# Patient Record
Sex: FEMALE | Race: WHITE | NOT HISPANIC OR LATINO | URBAN - NONMETROPOLITAN AREA
[De-identification: names, ages, dates, MRNs, and addresses within clinical notes are randomized per-mention and may not be internally consistent; named-entity substitution may affect disease eponyms.]

---

## 2017-03-01 ENCOUNTER — OFFICE VISIT (OUTPATIENT)
Dept: OTOLARYNGOLOGY | Facility: CLINIC | Age: 62
End: 2017-03-01

## 2017-03-01 VITALS — WEIGHT: 118 LBS | HEIGHT: 66 IN | BODY MASS INDEX: 18.96 KG/M2 | TEMPERATURE: 97.4 F

## 2017-03-01 DIAGNOSIS — H81.09 MENIERE DISEASE, UNSPECIFIED LATERALITY: ICD-10-CM

## 2017-03-01 DIAGNOSIS — J31.0 CHRONIC RHINITIS: Primary | ICD-10-CM

## 2017-03-01 PROCEDURE — 99213 OFFICE O/P EST LOW 20 MIN: CPT | Performed by: OTOLARYNGOLOGY

## 2017-03-01 RX ORDER — RALOXIFENE HYDROCHLORIDE 60 MG/1
60 TABLET, FILM COATED ORAL DAILY
COMMUNITY
Start: 2016-11-18 | End: 2017-11-19

## 2017-03-01 RX ORDER — GUAIFENESIN 600 MG/1
1200 TABLET, EXTENDED RELEASE ORAL AS NEEDED
COMMUNITY

## 2017-03-01 RX ORDER — ESTRADIOL 10 UG/1
10 INSERT VAGINAL 2 TIMES WEEKLY
COMMUNITY
End: 2017-08-24

## 2017-03-01 RX ORDER — ALBUTEROL SULFATE 2.5 MG/3ML
2.5 SOLUTION RESPIRATORY (INHALATION) AS NEEDED
COMMUNITY

## 2017-03-02 NOTE — PROGRESS NOTES
Subjective   Elsi Mccollum is a 61 y.o. female.       History of Present Illness   Patient is followed with Ménière's disease which has responded well to therapy utilizing a half tablet of triamterene/HCTZ daily.  Returns ahead of schedule today because she says she's had multiple sinus infections through the winter.  She is being treated by an allergist and uses Flonase and Singulair.  During flareup she describes nasal congestion facial pain postnasal drainage and productive cough.  Is usually treated with antibiotics and steroids.      The following portions of the patient's history were reviewed and updated as appropriate: allergies, current medications, past family history, past medical history, past social history, past surgical history and problem list.     reports that she has never smoked. She does not have any smokeless tobacco history on file. She reports that she drinks alcohol.   Patient is not a tobacco user and has not been counseled for use of tobacco products      Review of Systems   Constitutional: Negative for fever.           Objective   Physical Exam  General: Well-developed well-nourished female in no acute distress.  Alert and oriented ×3. Head: Normocephalic. Face: Symmetrical strength and appearance. PERRL. EOMI. Voice:Strong. Speech:Fluent  Ears: External ears no deformity, canals no discharge, tympanic membranes intact clear and mobile bilaterally.  Nose: Nares show no discharge mass polyp or purulence.  Boggy mucosa is present.  No gross external deformity.  Septum: To the right  Oral cavity: Lips and gums without lesions.  Tongue and floor of mouth without lesions.  Parotid and submandibular ducts unobstructed.  No mucosal lesions on the buccal mucosa or vestibule of the mouth.  Pharynx: No erythema exudate mass or ulcer  Neck: No lymphadenopathy.  No thyromegaly.  Trachea and larynx midline.  No masses in the parotid or submandibular glands.      Assessment/Plan   Elsi was seen today  for follow-up.    Diagnoses and all orders for this visit:    Chronic rhinitis    Meniere disease, unspecified laterality        Plan: Told the patient that I believe she likely has just had recurring viral upper respiratory infections or the course of the winter.  I have urged her to continue using her Flonase along with Singulair and using nasal saline spray frequently.  I'll be interested to see what her clinical course is as the weather gets warmer.  If her symptoms improved significantly and I think this confirms my believe that these are just recurring viral illnesses and not actual chronic bacterial rhinosinusitis.  If she improves she should just keep her previously scheduled follow-up for August 2017 but call if her recurrent nasal symptoms persist into the warmer months.

## 2017-08-24 ENCOUNTER — CLINICAL SUPPORT (OUTPATIENT)
Dept: AUDIOLOGY | Facility: CLINIC | Age: 62
End: 2017-08-24

## 2017-08-24 ENCOUNTER — OFFICE VISIT (OUTPATIENT)
Dept: OTOLARYNGOLOGY | Facility: CLINIC | Age: 62
End: 2017-08-24

## 2017-08-24 VITALS — HEIGHT: 66 IN | TEMPERATURE: 97.6 F | WEIGHT: 123 LBS | BODY MASS INDEX: 19.77 KG/M2

## 2017-08-24 DIAGNOSIS — H81.09 MENIERE DISEASE, UNSPECIFIED LATERALITY: Primary | ICD-10-CM

## 2017-08-24 DIAGNOSIS — Z01.10 ENCOUNTER FOR EXAMINATION OF HEARING WITHOUT ABNORMAL FINDINGS: Primary | ICD-10-CM

## 2017-08-24 PROCEDURE — 99213 OFFICE O/P EST LOW 20 MIN: CPT | Performed by: OTOLARYNGOLOGY

## 2017-08-24 PROCEDURE — 92557 COMPREHENSIVE HEARING TEST: CPT | Performed by: AUDIOLOGIST

## 2017-08-24 PROCEDURE — 92567 TYMPANOMETRY: CPT | Performed by: AUDIOLOGIST

## 2017-08-24 RX ORDER — TRIAMTERENE AND HYDROCHLOROTHIAZIDE 37.5; 25 MG/1; MG/1
CAPSULE ORAL
Qty: 45 CAPSULE | Refills: 3 | Status: SHIPPED | OUTPATIENT
Start: 2017-08-24 | End: 2018-09-27 | Stop reason: SDUPTHER

## 2017-08-24 NOTE — PROGRESS NOTES
STANDARD AUDIOMETRIC EVALUATION      Name:  Elsi Mccollum  :  1955  Age:  61 y.o.  Date of Evaluation:  2017      HISTORY    Reason for visit:  Elsi Mccollum is seen today for a hearing evaluation at the request of Dr. Russ Breaux.  Patient reports no changes in her hearing and no major Meniere's attacks.      EVALUATION    See Audiogram    RESULTS        Otoscopy and Tympanometry 226 Hz :  Right Ear:  Otoscopy:  Clear ear canal          Tympanometry:  Middle ear function within normal limits    Left Ear:   Otoscopy:  Clear ear canal        Tympanometry:  Middle ear function within normal limits    Test technique:  Standard Audiometry     Pure Tone Audiometry:   Patient responded to pure tones at 0-15 dB for 250-8000 Hz in right ear, and at 0-10 dB for 250-8000 Hz in left ear.       Speech Audiometry:        Right Ear:  Speech Reception Threshold (SRT) was obtained at 0 dBHL                 Speech Discrimination scores were 100% in quiet when words were presented at 40 dBHL       Left Ear:  Speech Reception Threshold (SRT) was obtained at 5 dBHL                 Speech Discrimination scores were 100% in quiet when words were presented at 45 dBHL    Reliability:   good    IMPRESSIONS:  1.  Tympanometry results are consistent with Middle ear function within normal limits in both ears.  2.  Pure tone results are consistent with hearing sensitivity within normal limits for both ears.       RECOMMENDATIONS:  Patient is seeing the Ear Nose and Throat physician immediately following this examination.  It was a pleasure seeing Elsi Mccollum in Audiology today.  We would be happy to do further testing or discuss these test as necessary.          This document has been electronically signed by KRYSTYNA White on 2017 10:57 AM          KRYSTYNA White  Licensed Audiologist

## 2017-08-27 NOTE — PROGRESS NOTES
Subjective   Elsi Mccollum is a 61 y.o. female.       History of Present Illness   Patient has a history of Ménière's disease that responded well to medical therapy.  Hearing returned completely normal bilaterally.  Uses a half tablet of triamterene HCTZ 37.5/25 daily.  Says she's had no significant change in her hearing and no dizziness.  Was also seen during the winter with chronic rhinitis thought to be recurrent viral illnesses says her nose is doing much better now that the weather has improved.  No otorrhea.      The following portions of the patient's history were reviewed and updated as appropriate: allergies, current medications, past family history, past medical history, past social history, past surgical history and problem list.     reports that she has never smoked. She has never used smokeless tobacco. She reports that she drinks alcohol.   Patient is not a tobacco user and has not been counseled for use of tobacco products      Review of Systems   Constitutional: Negative for fever.           Objective   Physical Exam  General: Well-developed well-nourished female in no acute distress.  Alert and oriented ×3. Head: Normocephalic. Face: Symmetrical strength and appearance. PERRL. EOMI. Voice:Strong. Speech:Fluent  Ears: External ears no deformity, canals no discharge, tympanic membranes intact clear and mobile bilaterally.  Nose: Nares show no discharge mass polyp or purulence.  Boggy mucosa is present.  No gross external deformity.  Septum: Midline  Oral cavity: Lips and gums without lesions.  Tongue and floor of mouth without lesions.  Parotid and submandibular ducts unobstructed.  No mucosal lesions on the buccal mucosa or vestibule of the mouth.  Pharynx: No erythema exudate mass or ulcer  Neck: No lymphadenopathy.  No thyromegaly.  Trachea and larynx midline.  No masses in the parotid or submandibular glands.    Audiogram is obtained and reviewed and shows normal hearing bilaterally with type A  tympanograms.  Discrimination scores are 100% bilaterally.      Assessment/Plan   Elsi was seen today for follow-up.    Diagnoses and all orders for this visit:    Meniere disease, unspecified laterality        Plan: Given her excellent clinical results as well as the fact that she is tolerating her treatment regimen I don't think anything should be changed at this point a new prescription will be sent in for her diuretic and she'll be seen in a year with another hearing test.  She'll be seeing Dr. Hall next month for her annual physical and will have her blood work done there.

## 2018-01-10 ENCOUNTER — TRANSCRIBE ORDERS (OUTPATIENT)
Dept: ORTHOPEDIC SURGERY | Facility: CLINIC | Age: 63
End: 2018-01-10

## 2018-01-10 DIAGNOSIS — M79.645 FINGER PAIN, LEFT: Primary | ICD-10-CM

## 2018-01-24 ENCOUNTER — OFFICE VISIT (OUTPATIENT)
Dept: ORTHOPEDIC SURGERY | Facility: CLINIC | Age: 63
End: 2018-01-24

## 2018-01-24 VITALS — HEIGHT: 66 IN | BODY MASS INDEX: 20.89 KG/M2 | WEIGHT: 130 LBS

## 2018-01-24 DIAGNOSIS — M79.645 FINGER PAIN, LEFT: Primary | ICD-10-CM

## 2018-01-24 DIAGNOSIS — S63.259A DISLOCATION CLOSED, FINGER, INITIAL ENCOUNTER: ICD-10-CM

## 2018-01-24 PROCEDURE — 99202 OFFICE O/P NEW SF 15 MIN: CPT | Performed by: ORTHOPAEDIC SURGERY

## 2018-01-24 NOTE — PROGRESS NOTES
Elsi Mccollum is a 62 y.o. female   Primary provider:  Ronaldo Hall MD       Chief Complaint   Patient presents with   • Left Hand - Pain     Index finger.        HISTORY OF PRESENT ILLNESS: patient was squatting at dog and finger bent.     Hand Pain    The incident occurred more than 1 week ago (10/8/2017). The incident occurred at home. The pain is present in the left fingers. The quality of the pain is described as aching and stabbing (redness, swelling. ). The pain does not radiate. The pain is at a severity of 2/10. The pain is mild. The pain has been intermittent since the incident. Exacerbated by: using hand.  She has tried nothing for the symptoms.   A she stated she fell and dislocated her finger when she was camping in October of this year she was seen in the [facility] she reduced it herself swelled up she is placed in a splint for a while nobody, told her what the problem was what to do and that she's just started moving it better prominence pain and limited range of motion had an x-ray back in 2017 which shows questionable lesion in the middle phalanx the new x-ray in 2018 questionable but we have no other films sent to us to compare to and there is a little evulsion fracture of the volar plate was consistent with a dislocation joint is reduced     CONCURRENT MEDICAL HISTORY:    Past Medical History:   Diagnosis Date   • Acute bronchitis    • Common cold    • Conjunctival hemorrhage of right eye    • Cough    • Diarrhea    • Fever    • Nausea and vomiting    • Neck pain    • Upper respiratory infection        Allergies   Allergen Reactions   • Penicillins          Current Outpatient Prescriptions:   •  albuterol (PROVENTIL) (2.5 MG/3ML) 0.083% nebulizer solution, Inhale 2.5 mg As Needed., Disp: , Rfl:   •  fluticasone (FLONASE) 50 MCG/ACT nasal spray, 2 SPRAYS BY NASAL ROUTE DAILY., Disp: , Rfl: 3  •  Fluticasone Furoate-Vilanterol (BREO ELLIPTA) 100-25 MCG/INH aerosol powder , Inhale 1 puff  "Daily., Disp: , Rfl:   •  guaiFENesin (MUCINEX) 600 MG 12 hr tablet, Take 1,200 mg by mouth As Needed., Disp: , Rfl:   •  montelukast (SINGULAIR) 10 MG tablet, Take 10 mg by mouth every night., Disp: , Rfl:   •  pantoprazole (PROTONIX) 40 MG EC tablet, Take 40 mg by mouth daily., Disp: , Rfl:   •  triamterene-hydrochlorothiazide (DYAZIDE) 37.5-25 MG per capsule, 1/2 tab po qd, Disp: 45 capsule, Rfl: 3  •  triamterene-hydrochlorothiazide (MAXZIDE-25) 37.5-25 MG per tablet, Take 1 tablet by mouth every other day., Disp: , Rfl:   •  VESICARE 5 MG tablet, TAKE ONE TABLET BY MOUTH DAILY., Disp: , Rfl: 4    Past Surgical History:   Procedure Laterality Date   • INJECTION OF MEDICATION  01/15/2013    Pancho Wilson)  Carrol Gaona       No family history on file.     Social History     Social History   • Marital status:      Spouse name: N/A   • Number of children: N/A   • Years of education: N/A     Occupational History   • Not on file.     Social History Main Topics   • Smoking status: Never Smoker   • Smokeless tobacco: Never Used   • Alcohol use Yes      Comment: social   • Drug use: Not on file   • Sexual activity: Not on file     Other Topics Concern   • Not on file     Social History Narrative        Review of Systems   Constitutional:        Hay fever   All other systems reviewed and are negative.      PHYSICAL EXAMINATION:       Ht 167.6 cm (66\")  Wt 59 kg (130 lb)  BMI 20.98 kg/m2    Physical Exam    GAIT:     [x]  Normal  []  Antalgic    Assistive device: [x]  None  []  Walker     []  Crutches  []  Cane     []  Wheelchair  []  Stretcher    Ortho Exam  Patient lacks about 5° full extension she has about 90° of flexion joint stable this is enlarged in comparison to the other fingers or digits on the opposite hand or same hand she has pain in the joint and another neurovascular status is normal all tendons are functional  X-ray finger index left 2 or 3 views1/4/2018  [facility]  Result Impression "       Questionable fracture palmar aspect base middle phalanx index finger.  Please correlate for specific tenderness in this area.   Result Narrative   Indication:  Injured left index finger.    Left Index Finger, Three Views:  The index finger is normally aligned.  Along the palmar aspect of the base of the middle phalanx, there is a 1.0 x 2.0 mm lucent lesion that was seen on the November 2, 2017 film.  A subtle pathologic fracture along the   articular aspect of the cyst is possible.    No other bony abnormality.   Status Results Details     Encounter Summary   2017 November  X-ray hand left 3+ views11/2/2017  [facility]  Result Impression     No fracture or dislocation evident.   Result Narrative   Left hand, three views    Indication:  Left finger pain, dislocation injury.    Findings: No fracture or dislocation is evident in the left hand. Mineralization and alignment are normal. No erosions or abnormal soft tissue calcifications are seen.  Carpal bone alignment is normal. Mild soft tissue swelling is seen around the second   digit with no fracture identified. Mild/moderate degenerative changes are seen at the first CMC joint and bulky osteophytes also seen around the distal ulna which may correlate with an old healed fracture.             ASSESSMENT:    Diagnoses and all orders for this visit:    Finger pain, left          PLANGet her old x-rays show he can review all 3 x-rays and I instructed her in exercises activity for her finger she has limited range of motion secondary post op dislocation she started protecting her finger when in actuality she should be working it much harder exercises were given to her and we'll follow her up after she brings her x-rays    No Follow-up on file.    Kwabena Garzon MD

## 2018-01-29 ENCOUNTER — OFFICE VISIT (OUTPATIENT)
Dept: ORTHOPEDIC SURGERY | Facility: CLINIC | Age: 63
End: 2018-01-29

## 2018-01-29 VITALS — HEIGHT: 66 IN | WEIGHT: 130 LBS | BODY MASS INDEX: 20.89 KG/M2

## 2018-01-29 DIAGNOSIS — S63.259D DISLOCATION, FINGER, SUBSEQUENT ENCOUNTER: ICD-10-CM

## 2018-01-29 DIAGNOSIS — M79.645 FINGER PAIN, LEFT: Primary | ICD-10-CM

## 2018-01-29 PROCEDURE — 99213 OFFICE O/P EST LOW 20 MIN: CPT | Performed by: ORTHOPAEDIC SURGERY

## 2018-01-29 NOTE — PROGRESS NOTES
Elsi Mccollum is a 62 y.o. female returns for     Chief Complaint   Patient presents with   • Left Hand - Follow-up     Index finger        HISTORY OF PRESENT ILLNESS: patient in today with copies of xrays to go over.   Patient returned with all her x-rays reviewed all her films from initial films were actually negative and now she's develop a small fragment of the volar aspect probably in the volar plate and there is some questionable cyst there is no tumor or intraosseous lesion     CONCURRENT MEDICAL HISTORY:    Past Medical History:   Diagnosis Date   • Acute bronchitis    • Common cold    • Conjunctival hemorrhage of right eye    • Cough    • Diarrhea    • Fever    • Nausea and vomiting    • Neck pain    • Upper respiratory infection        Allergies   Allergen Reactions   • Penicillins          Current Outpatient Prescriptions:   •  albuterol (PROVENTIL) (2.5 MG/3ML) 0.083% nebulizer solution, Inhale 2.5 mg As Needed., Disp: , Rfl:   •  fluticasone (FLONASE) 50 MCG/ACT nasal spray, 2 SPRAYS BY NASAL ROUTE DAILY., Disp: , Rfl: 3  •  Fluticasone Furoate-Vilanterol (BREO ELLIPTA) 100-25 MCG/INH aerosol powder , Inhale 1 puff Daily., Disp: , Rfl:   •  guaiFENesin (MUCINEX) 600 MG 12 hr tablet, Take 1,200 mg by mouth As Needed., Disp: , Rfl:   •  montelukast (SINGULAIR) 10 MG tablet, Take 10 mg by mouth every night., Disp: , Rfl:   •  pantoprazole (PROTONIX) 40 MG EC tablet, Take 40 mg by mouth daily., Disp: , Rfl:   •  triamterene-hydrochlorothiazide (DYAZIDE) 37.5-25 MG per capsule, 1/2 tab po qd, Disp: 45 capsule, Rfl: 3  •  triamterene-hydrochlorothiazide (MAXZIDE-25) 37.5-25 MG per tablet, Take 1 tablet by mouth every other day., Disp: , Rfl:   •  VESICARE 5 MG tablet, TAKE ONE TABLET BY MOUTH DAILY., Disp: , Rfl: 4    Past Surgical History:   Procedure Laterality Date   • INJECTION OF MEDICATION  01/15/2013    Eusebioalog (2)  -  YARELIS Gaona       ROS  No fevers or chills.  No chest pain or shortness of air.  No GI  "or  disturbances.    PHYSICAL EXAMINATION:       Ht 167.6 cm (66\")  Wt 59 kg (130 lb)  BMI 20.98 kg/m2    Physical Exam    GAIT:     [x]  Normal  []  Antalgic    Assistive device: [x]  None  []  Walker     []  Crutches  []  Cane     []  Wheelchair  []  Stretcher    Ortho Exam compare to the previous examination week ago patient's range of motion is improved by her own therapy I discussed therapy with her and her  and we decided that she will do her own therapy on her own discontinue use her finger in time this should improve and increase her range of motion I gave her all her x-rays back to her on disks      No results found.          ASSESSMENT:    Diagnoses and all orders for this visit:    Finger pain, left    Dislocation, finger, subsequent encounter          PLAN    No Follow-up on file.    Meaghan Mccurdy MA  "

## 2018-09-27 ENCOUNTER — OFFICE VISIT (OUTPATIENT)
Dept: OTOLARYNGOLOGY | Facility: CLINIC | Age: 63
End: 2018-09-27

## 2018-09-27 ENCOUNTER — CLINICAL SUPPORT (OUTPATIENT)
Dept: AUDIOLOGY | Facility: CLINIC | Age: 63
End: 2018-09-27

## 2018-09-27 VITALS — WEIGHT: 116.4 LBS | OXYGEN SATURATION: 98 % | HEIGHT: 65 IN | BODY MASS INDEX: 19.39 KG/M2

## 2018-09-27 DIAGNOSIS — Z01.10 ENCOUNTER FOR EXAMINATION OF HEARING WITHOUT ABNORMAL FINDINGS: Primary | ICD-10-CM

## 2018-09-27 DIAGNOSIS — H81.09 MENIERE'S DISEASE, UNSPECIFIED LATERALITY: Primary | ICD-10-CM

## 2018-09-27 PROCEDURE — 99213 OFFICE O/P EST LOW 20 MIN: CPT | Performed by: OTOLARYNGOLOGY

## 2018-09-27 PROCEDURE — 92567 TYMPANOMETRY: CPT | Performed by: AUDIOLOGIST

## 2018-09-27 PROCEDURE — 92557 COMPREHENSIVE HEARING TEST: CPT | Performed by: AUDIOLOGIST

## 2018-09-27 RX ORDER — TRIAMTERENE AND HYDROCHLOROTHIAZIDE 37.5; 25 MG/1; MG/1
CAPSULE ORAL
Qty: 45 CAPSULE | Refills: 3 | Status: SHIPPED | OUTPATIENT
Start: 2018-09-27 | End: 2019-10-02

## 2018-09-27 RX ORDER — TRAZODONE HYDROCHLORIDE 50 MG/1
50 TABLET ORAL NIGHTLY
COMMUNITY
End: 2023-01-10

## 2018-09-27 NOTE — PROGRESS NOTES
STANDARD AUDIOMETRIC EVALUATION      Name:  Elsi Mccollum  :  1955  Age:  62 y.o.  Date of Evaluation:  2018      HISTORY    Reason for visit:  Elsi Mccollum is seen today for a hearing evaluation at the request of Dr. Russ Breaux.  Patient reports her ears are okay today, and she has not had any changes or problems.      EVALUATION    See Audiogram    RESULTS        Otoscopy and Tympanometry 226 Hz :  Right Ear:  Otoscopy:  Clear ear canal          Tympanometry:  Middle ear function within normal limits    Left Ear:   Otoscopy:  Clear ear canal        Tympanometry:  Middle ear function within normal limits    Test technique:  Standard Audiometry     Pure Tone Audiometry:   Patient responded to pure tones at 5-15 dB for 250-8000 Hz in both ears.      Speech Audiometry:        Right Ear:  Speech Reception Threshold (SRT) was obtained at 5 dBHL                 Speech Discrimination scores were 100% in quiet when words were presented at 45 dBHL       Left Ear:  Speech Reception Threshold (SRT) was obtained at 5 dBHL                 Speech Discrimination scores were 100% in quiet when words were presented at 45 dBHL    Reliability:   good    IMPRESSIONS:  1.  Tympanometry results are consistent with Middle ear function within normal limits in both ears.  2.  Pure tone results are consistent with hearing sensitivity within normal limits for both ears.       RECOMMENDATIONS:  Patient is seeing the Ear Nose and Throat physician immediately following this examination.  It was a pleasure seeing Elsi Mccollum in Audiology today.  We would be happy to do further testing or discuss these test as necessary.          This document has been electronically signed by Leticia Barillas MS CCC-ANTWON on 2018 10:00 AM       Leticia Barillas MS CCC-ANTWON  Licensed Audiologist

## 2018-10-01 RX ORDER — TRIAMTERENE AND HYDROCHLOROTHIAZIDE 37.5; 25 MG/1; MG/1
TABLET ORAL
Qty: 45 TABLET | Refills: 3 | OUTPATIENT
Start: 2018-10-01

## 2018-10-01 NOTE — PROGRESS NOTES
Subjective   Elsi Mccollum is a 62 y.o. female.       History of Present Illness   Patient has been followed with a history of Ménière's disease that responded well to medical therapy.  Her hearing had returned to normal in both ears.  Typically uses a half tablet of triamterene/HCTZ 37.5/25 daily.  Reports that her  passed away this summer and since then she has not been taking her medication regularly.  Says she is not noticed any change in her hearing or any significant dizziness.  No otorrhea.      The following portions of the patient's history were reviewed and updated as appropriate: allergies, current medications, past family history, past medical history, past social history, past surgical history and problem list.     reports that she has never smoked. She has never used smokeless tobacco. She reports that she drinks alcohol.   Patient is not a tobacco user and has not been counseled for use of tobacco products      Review of Systems   Constitutional: Negative for fever.           Objective   Physical Exam  General: Well-developed well-nourished female in no acute distress.  Alert and oriented ×3. Head: Normocephalic. Voice:Strong. Speech:Fluent  Ears: External ears no deformity, canals show some nonobstructing wax is cleaned as a courtesy, tympanic membranes intact clear and mobile bilaterally.  Nose: Nares show no discharge mass polyp or purulence.  Boggy mucosa is present.  No gross external deformity.  Septum: Midline  Oral cavity: Lips and gums without lesions.  Tongue and floor of mouth without lesions.  Parotid and submandibular ducts unobstructed.  No mucosal lesions on the buccal mucosa or vestibule of the mouth.  Pharynx: No erythema exudate mass or ulcer  Neck: No lymphadenopathy.  No thyromegaly.  Trachea and larynx midline.  No masses in the parotid or submandibular glands.    Audiogram is obtained and reviewed and shows normal hearing bilaterally with type A tympanograms.   Discrimination scores are 100% bilaterally.      Assessment/Plan   Elsi was seen today for follow-up.    Diagnoses and all orders for this visit:    Meniere's disease, unspecified laterality    Other orders  -     triamterene-hydrochlorothiazide (DYAZIDE) 37.5-25 MG per capsule; 1/2 tab po qd        Plan: Since her disease as well controlled, I will go ahead and refill her triamterene/HCTZ at her current dosage of half tablet daily, but told her since she has not been taking it regularly if she wants to try to manage this with low salt diet only and no medication I think that would be reasonable.  I told her if she did discontinue the medication to just return in a year for reevaluation but call right away and/or resume medical therapy if she experiences recurrence of her symptoms

## 2019-10-02 ENCOUNTER — OFFICE VISIT (OUTPATIENT)
Dept: OTOLARYNGOLOGY | Facility: CLINIC | Age: 64
End: 2019-10-02

## 2019-10-02 VITALS — OXYGEN SATURATION: 98 % | WEIGHT: 127.2 LBS | BODY MASS INDEX: 21.19 KG/M2 | HEIGHT: 65 IN | HEART RATE: 78 BPM

## 2019-10-02 DIAGNOSIS — H61.23 BILATERAL IMPACTED CERUMEN: Primary | ICD-10-CM

## 2019-10-02 DIAGNOSIS — H81.09 MENIERE'S DISEASE, UNSPECIFIED LATERALITY: ICD-10-CM

## 2019-10-02 PROCEDURE — 69210 REMOVE IMPACTED EAR WAX UNI: CPT | Performed by: OTOLARYNGOLOGY

## 2019-10-02 PROCEDURE — 99213 OFFICE O/P EST LOW 20 MIN: CPT | Performed by: OTOLARYNGOLOGY

## 2019-10-02 RX ORDER — LORATADINE 10 MG/1
10 TABLET ORAL DAILY
COMMUNITY
End: 2023-01-10

## 2019-10-02 RX ORDER — TRIAMTERENE AND HYDROCHLOROTHIAZIDE 37.5; 25 MG/1; MG/1
TABLET ORAL
Qty: 30 TABLET | Refills: 6 | Status: SHIPPED | OUTPATIENT
Start: 2019-10-02

## 2019-10-02 RX ORDER — ESTRADIOL 10 UG/1
10 INSERT VAGINAL DAILY
COMMUNITY
Start: 2018-08-16 | End: 2023-01-10

## 2019-10-02 RX ORDER — CLOBETASOL PROPIONATE 0.05 G/100ML
SHAMPOO TOPICAL
Refills: 5 | COMMUNITY
Start: 2019-08-12 | End: 2023-01-10

## 2019-10-04 NOTE — PROGRESS NOTES
Subjective   Elsi Mccollum is a 63 y.o. female.       History of Present Illness   Patient has Ménière's disease.  Hearing had responded to treatment with diuretic therapy.  Hearing had returned to normal in both ears.  When she was seen a year ago she had been only taking her diuretic intermittently and was not having any significant symptoms so I told her if she wanted to try herself off of the medication she could.  She reports she tried on a couple of occasions but reports that in each instance the roaring in her ears and fullness would get worse and so she now takes 1/2 tablet of triamterene/HCTZ 37.5/25 every day.  She feels like her hearing is back to normal.  Dr. Hall checks lab work on her.  No significant dizziness.      The following portions of the patient's history were reviewed and updated as appropriate: allergies, current medications, past family history, past medical history, past social history, past surgical history and problem list.     reports that she has never smoked. She has never used smokeless tobacco. She reports that she drinks alcohol.   Patient is not a tobacco user and has not been counseled for use of tobacco products      Review of Systems   Constitutional: Negative for fever.           Objective   Physical Exam  Ears: External ears no deformity.  Both ear canals show cerumen impactions  Using the binocular microscope for visualization, cerumen impaction was removed from bilateral ear canal(s) using instrumentation. This was personally performed by Russ Breaux MD  Following cerumen removal tympanic membranes were noted to be intact and clear  Nose: Nares show no discharge mass polyp or purulence.  Boggy mucosa is present.  No gross external deformity.  Oral cavity: Lips and gums without lesions.  Tongue and floor of mouth without lesions.  Parotid and submandibular ducts unobstructed.  No mucosal lesions on the buccal mucosa or vestibule of the mouth.  Pharynx: No  erythema, exudate, mass  Neck: No lymphadenopathy.  No thyromegaly.  Trachea and larynx midline.  No masses in the parotid or submandibular glands.  Audiogram is not obtained today because once the patient's ears were cleaned she felt like her hearing was normal and at baseline in both ears.    Assessment/Plan   Elsi was seen today for follow-up.    Diagnoses and all orders for this visit:    Bilateral impacted cerumen    Meniere's disease, unspecified laterality    Other orders  -     triamterene-hydrochlorothiazide (MAXZIDE-25) 37.5-25 MG per tablet; 1/2 tablet by mouth daily      Plan: Cerumen removed as described above.  Since she notices significantly worsened symptoms when she tries to go without her diuretic I think she should continue triamterene/HCTZ 37.5/25 1 p.o. daily.  Follow-up with Dr. Hall regarding her labs.  Return to see me in 1 year, call sooner for problems.

## 2019-10-16 ENCOUNTER — OFFICE VISIT (OUTPATIENT)
Dept: OTOLARYNGOLOGY | Facility: CLINIC | Age: 64
End: 2019-10-16

## 2019-10-16 VITALS — HEIGHT: 65 IN | WEIGHT: 127 LBS | RESPIRATION RATE: 18 BRPM | BODY MASS INDEX: 21.16 KG/M2

## 2019-10-16 DIAGNOSIS — H92.01 RIGHT EAR PAIN: Primary | ICD-10-CM

## 2019-10-16 PROCEDURE — 99213 OFFICE O/P EST LOW 20 MIN: CPT | Performed by: OTOLARYNGOLOGY

## 2019-10-16 RX ORDER — CICLOPIROX 1 G/100ML
SHAMPOO TOPICAL
Refills: 5 | COMMUNITY
Start: 2019-10-01 | End: 2023-01-10

## 2019-10-20 NOTE — PROGRESS NOTES
Subjective   Elsi Mccollum is a 63 y.o. female.       History of Present Illness   Patient with a history of Ménière's disease that has responded to diuretic therapy.  Was seen on 10-19.  Reports that shortly thereafter she began having some right-sided ear pain.  She says it feels like when she chews she has something in it is not been draining.  Pain is worse when she opens her jaw widely.      The following portions of the patient's history were reviewed and updated as appropriate: allergies, current medications, past family history, past medical history, past social history, past surgical history and problem list.     reports that she has never smoked. She has never used smokeless tobacco. She reports that she drinks alcohol.   Patient is not a tobacco user and has not been counseled for use of tobacco products      Review of Systems   Constitutional: Negative for fever.           Objective   Physical Exam  Ears: External ears no deformity, canals no discharge, tympanic membranes intact clear and mobile bilaterally.  Oral cavity: Lips and gums without lesions.  Tongue and floor of mouth without lesions.  Parotid and submandibular ducts unobstructed.  No mucosal lesions on the buccal mucosa or vestibule of the mouth.  Pharynx: No erythema or exudate  Neck: No lymphadenopathy.  No thyromegaly.  Trachea and larynx midline.  No masses in the parotid or submandibular glands.  TMJ is tender to palpation on the right.    Assessment/Plan   lEsi was seen today for ear problem.    Diagnoses and all orders for this visit:    Right ear pain      Plan: Explained to her that the feeling of fullness and pain is due to inflammation of the temporomandibular joint.  I reassured her she did not have anything in her ear that needed cleaning.  Advised soft diet and use of over-the-counter Aleve or ibuprofen.  If symptoms improve she may keep her previously scheduled follow-up.  If symptoms persist suggest a dental  evaluation.

## 2021-02-25 ENCOUNTER — IMMUNIZATION (OUTPATIENT)
Dept: VACCINE CLINIC | Facility: HOSPITAL | Age: 66
End: 2021-02-25

## 2021-02-25 PROCEDURE — 91300 HC SARSCOV02 VAC 30MCG/0.3ML IM: CPT | Performed by: THORACIC SURGERY (CARDIOTHORACIC VASCULAR SURGERY)

## 2021-02-25 PROCEDURE — 0001A: CPT | Performed by: THORACIC SURGERY (CARDIOTHORACIC VASCULAR SURGERY)

## 2021-03-18 ENCOUNTER — IMMUNIZATION (OUTPATIENT)
Dept: VACCINE CLINIC | Facility: HOSPITAL | Age: 66
End: 2021-03-18

## 2021-03-18 PROCEDURE — 0002A: CPT | Performed by: THORACIC SURGERY (CARDIOTHORACIC VASCULAR SURGERY)

## 2021-03-18 PROCEDURE — 91300 HC SARSCOV02 VAC 30MCG/0.3ML IM: CPT | Performed by: THORACIC SURGERY (CARDIOTHORACIC VASCULAR SURGERY)

## 2021-07-06 ENCOUNTER — OFFICE VISIT (OUTPATIENT)
Dept: OTOLARYNGOLOGY | Facility: CLINIC | Age: 66
End: 2021-07-06

## 2021-07-06 VITALS — WEIGHT: 131 LBS | HEIGHT: 65 IN | OXYGEN SATURATION: 91 % | HEART RATE: 70 BPM | BODY MASS INDEX: 21.83 KG/M2

## 2021-07-06 DIAGNOSIS — H61.23 BILATERAL IMPACTED CERUMEN: Primary | ICD-10-CM

## 2021-07-06 DIAGNOSIS — H81.09 MENIERE'S DISEASE, UNSPECIFIED LATERALITY: ICD-10-CM

## 2021-07-06 PROCEDURE — 99213 OFFICE O/P EST LOW 20 MIN: CPT | Performed by: OTOLARYNGOLOGY

## 2021-07-06 PROCEDURE — 69210 REMOVE IMPACTED EAR WAX UNI: CPT | Performed by: OTOLARYNGOLOGY

## 2021-07-06 NOTE — PROGRESS NOTES
"Subjective   Elsi Mccollum is a 65 y.o. female.       History of Present Illness     Patient has a history of Ménière's disease that responded well to triamterene/HCTZ.  I have not seen her since October 2019.  She says she \"hibernated\" during the pandemic.  Says her ears feel like they are full of wax.  She says she only rarely takes the diuretic pill now.  She is not having any dizziness.    The following portions of the patient's history were reviewed and updated as appropriate: allergies, current medications, past family history, past medical history, past social history, past surgical history and problem list.     reports that she has never smoked. She has never used smokeless tobacco. She reports current alcohol use.   Patient is not a tobacco user and has not been counseled for use of tobacco products      Review of Systems        Objective   Physical Exam  Both ear canals show cerumen impactions  Using the binocular microscope for visualization, cerumen impaction was removed from bilateral ear canal(s) using instrumentation. This was personally performed by Russ Breaux MD  Following cerumen removal tympanic membranes are noted to be intact with no infection or effusion    Assessment/Plan   Diagnoses and all orders for this visit:    1. Bilateral impacted cerumen (Primary)    2. Meniere's disease, unspecified laterality      Plan: Cerumen removed as described above.  As long as her Ménière's symptoms are well controlled she should just maintain a low-salt diet and avoid caffeine and alcohol.  Return in 6 months with a hearing test at that time.      "

## 2022-01-04 ENCOUNTER — CLINICAL SUPPORT (OUTPATIENT)
Dept: AUDIOLOGY | Facility: CLINIC | Age: 67
End: 2022-01-04

## 2022-01-04 ENCOUNTER — OFFICE VISIT (OUTPATIENT)
Dept: OTOLARYNGOLOGY | Facility: CLINIC | Age: 67
End: 2022-01-04

## 2022-01-04 VITALS — HEIGHT: 65 IN | WEIGHT: 128 LBS | HEART RATE: 86 BPM | BODY MASS INDEX: 21.33 KG/M2 | OXYGEN SATURATION: 95 %

## 2022-01-04 DIAGNOSIS — H81.09 MENIERE'S DISEASE, UNSPECIFIED LATERALITY: Primary | ICD-10-CM

## 2022-01-04 DIAGNOSIS — H81.09 MENIERE'S DISEASE, UNSPECIFIED LATERALITY: ICD-10-CM

## 2022-01-04 DIAGNOSIS — J31.0 CHRONIC RHINITIS: ICD-10-CM

## 2022-01-04 DIAGNOSIS — H60.63 CHRONIC NON-INFECTIVE OTITIS EXTERNA OF BOTH EARS, UNSPECIFIED TYPE: ICD-10-CM

## 2022-01-04 DIAGNOSIS — H93.13 TINNITUS OF BOTH EARS: Primary | ICD-10-CM

## 2022-01-04 PROCEDURE — 92557 COMPREHENSIVE HEARING TEST: CPT | Performed by: AUDIOLOGIST

## 2022-01-04 PROCEDURE — 99214 OFFICE O/P EST MOD 30 MIN: CPT | Performed by: OTOLARYNGOLOGY

## 2022-01-04 PROCEDURE — 92567 TYMPANOMETRY: CPT | Performed by: AUDIOLOGIST

## 2022-01-04 RX ORDER — TRIAMCINOLONE ACETONIDE 55 UG/1
2 SPRAY, METERED NASAL DAILY
Qty: 16.5 G | Refills: 11 | Status: SHIPPED | OUTPATIENT
Start: 2022-01-04 | End: 2023-01-04

## 2022-01-04 NOTE — PROGRESS NOTES
"STANDARD AUDIOMETRIC EVALUATION      Name:  Elsi Mccollum  :  1955  Age:  66 y.o.  Date of Evaluation:  2022      HISTORY    Reason for visit:  Elsi Mccollum is seen today for a 6 month follow up hearing test at the request of Dr. Russ Breaux.  Reportedly, she has a history of Meniere's Disease.  Today, Patient reports she doesn't have problems hearing, but she does hear a \"wind\" noise in both ears.  She states she is not having any dizziness.        EVALUATION    See Audiogram    RESULTS        Otoscopy and Tympanometry 226 Hz :  Right Ear:  Otoscopy:  Clear ear canal          Tympanometry:  Middle ear function within normal limits    Left Ear:   Otoscopy:  Clear ear canal        Tympanometry:  Middle ear function within normal limits    Test technique:  Standard Audiometry     Pure Tone Audiometry:   Patient responded to pure tones at 5-10 dB for 250-8000 Hz in right ear, and at 5-15 dB for 250-8000 Hz in left ear.       Speech Audiometry:        Right Ear:  Speech Reception Threshold (SRT) was obtained at 0 dBHL                 Speech Discrimination scores were 100% in quiet when words were presented at 40 dBHL       Left Ear:  Speech Reception Threshold (SRT) was obtained at 0 dBHL                 Speech Discrimination scores were 100% in quiet when words were presented at 40 dBHL    Reliability:   good    IMPRESSIONS:  1.  Tympanometry results are consistent with Middle ear function within normal limits in both ears.  2.  Pure tone results are consistent with hearing sensitivity within normal limits for both ears.       RECOMMENDATIONS:  Patient is seeing the Ear Nose and Throat physician immediately following this examination.  It was a pleasure seeing Elsi Mccollum in Audiology today.  We would be happy to do further testing or discuss these test as necessary.          This document has been electronically signed by Leticia Barillas MS CCC-A on 2022 11:19 CST   "     Leticia Barillas MS [facility]  Licensed Audiologist

## 2022-01-10 NOTE — PROGRESS NOTES
Subjective   Elsi Mccollum is a 66 y.o. female.       History of Present Illness     Patient has a history of Ménière's disease that is responded well to diuretic therapy.  She has subsequently stopped using the diuretic and is maintaining this with dietary precautions.  She also has chronic rhinitis for which she had previously used Flonase however she has developed increased intraocular pressure and her ophthalmologist/optometrist has suggested she change nasal steroids.  She does not feel like her hearing is significantly changed.  Her last audiogram was in September 2018.    The following portions of the patient's history were reviewed and updated as appropriate: allergies, current medications, past family history, past medical history, past social history, past surgical history and problem list.     reports that she has never smoked. She has never used smokeless tobacco. She reports current alcohol use.   Patient is not a tobacco user and has not been counseled for use of tobacco products      Review of Systems        Objective   Physical Exam  Ears: Both ear canals show uninfected squamous debris that is cleaned under the microscope using instrumentation.  Beyond this tympanic membranes are intact no infection or effusion  Nares: Boggy mucosa no discharge or purulence    Audiogram is obtained and reviewed and shows hearing entirely within normal limits frequencies in both ears.  Discrimination scores are 100% bilaterally.  Tympanograms are type a bilaterally.      Assessment/Plan   Diagnoses and all orders for this visit:    1. Meniere's disease, unspecified laterality (Primary)    2. Chronic non-infective otitis externa of both ears, unspecified type    3. Chronic rhinitis    Other orders  -     Triamcinolone Acetonide (NASACORT) 55 MCG/ACT nasal inhaler; 2 sprays into the nostril(s) as directed by provider Daily.  Dispense: 16.5 g; Refill: 11      Plan: Ears cleaned as described above.  Will change her  nasal steroid to Nasacort which should have less of an issue with intraocular pressure.  Since her hearing remains normal and she is not on any diuretic therapy I have just advised continued dietary modifications.  Reevaluate in a year but will only retest her hearing if she is symptomatic.

## 2023-01-10 ENCOUNTER — OFFICE VISIT (OUTPATIENT)
Dept: OTOLARYNGOLOGY | Facility: CLINIC | Age: 68
End: 2023-01-10
Payer: MEDICARE

## 2023-01-10 VITALS — WEIGHT: 131.8 LBS | HEIGHT: 65 IN | TEMPERATURE: 97.6 F | BODY MASS INDEX: 21.96 KG/M2

## 2023-01-10 DIAGNOSIS — H81.09 MENIERE'S DISEASE, UNSPECIFIED LATERALITY: Primary | ICD-10-CM

## 2023-01-10 DIAGNOSIS — H60.63 CHRONIC NON-INFECTIVE OTITIS EXTERNA OF BOTH EARS, UNSPECIFIED TYPE: ICD-10-CM

## 2023-01-10 DIAGNOSIS — J31.0 CHRONIC RHINITIS: ICD-10-CM

## 2023-01-10 PROCEDURE — 99213 OFFICE O/P EST LOW 20 MIN: CPT | Performed by: OTOLARYNGOLOGY

## 2023-01-10 RX ORDER — TRIAMCINOLONE ACETONIDE 55 UG/1
SPRAY, METERED NASAL
COMMUNITY

## 2023-01-10 RX ORDER — METOPROLOL SUCCINATE 25 MG/1
TABLET, EXTENDED RELEASE ORAL
COMMUNITY

## 2023-01-10 RX ORDER — ERGOCALCIFEROL 1.25 MG/1
50000 CAPSULE ORAL
COMMUNITY
Start: 2022-03-04 | End: 2023-03-05

## 2023-01-10 RX ORDER — ALENDRONATE SODIUM 35 MG/1
35 TABLET ORAL
COMMUNITY
Start: 2022-11-24

## 2023-01-10 NOTE — PROGRESS NOTES
Subjective   Elsi Mccollum is a 67 y.o. female.       History of Present Illness   Patient has a remote history of Ménière's disease that responded well to medical therapy.  She now just manages this with dietary therapy and it has been quite sometime since she has had any kind of flareup.  Her hearing has been normal on previous audiograms.  Says she is not having any ear symptoms other than her left ear feels like it needs to be cleaned.  She also has chronic rhinitis.  Was changed from Flonase to Nasacort due to increased intraocular pressure and states that her ophthalmologist has told her her eye pressures have come back down.      The following portions of the patient's history were reviewed and updated as appropriate: allergies, current medications, past family history, past medical history, past social history, past surgical history and problem list.     reports that she has never smoked. She has never used smokeless tobacco. She reports current alcohol use.   Patient is not a tobacco user and has not been counseled for use of tobacco products      Review of Systems        Objective   Physical Exam  Ears: External ears no deformity.  Right ear canal shows no discharge today.  Tympanic membrane intact no infection or effusion.  Left ear shows uninfected squamous debris and hair that is cleaned under the microscope using instrumentation.  Beyond this tympanic membrane is intact no infection or effusion  Nares: Boggy mucosa no discharge or purulence         Assessment and Plan   Diagnoses and all orders for this visit:    1. Meniere's disease, unspecified laterality (Primary)    2. Chronic non-infective otitis externa of both ears, unspecified type    3. Chronic rhinitis             Plan: Ears cleaned as described above.  Continue low-salt diet.  Continue triamcinolone nose spray.  (She gets this over-the-counter as it is not covered by her insurance).  Return in 1 year.  Call for problems.